# Patient Record
Sex: FEMALE | Employment: OTHER | URBAN - METROPOLITAN AREA
[De-identification: names, ages, dates, MRNs, and addresses within clinical notes are randomized per-mention and may not be internally consistent; named-entity substitution may affect disease eponyms.]

---

## 2017-05-10 ENCOUNTER — OFFICE VISIT (OUTPATIENT)
Dept: OBGYN CLINIC | Facility: HOSPITAL | Age: 82
End: 2017-05-10
Payer: COMMERCIAL

## 2017-06-14 ENCOUNTER — OFFICE VISIT (OUTPATIENT)
Dept: OBGYN CLINIC | Facility: HOSPITAL | Age: 82
End: 2017-06-14
Payer: COMMERCIAL

## 2017-07-25 ENCOUNTER — OFFICE VISIT (OUTPATIENT)
Dept: OBGYN CLINIC | Facility: HOSPITAL | Age: 82
End: 2017-07-25
Payer: COMMERCIAL

## 2017-08-29 ENCOUNTER — OFFICE VISIT (OUTPATIENT)
Dept: OBGYN CLINIC | Facility: HOSPITAL | Age: 82
End: 2017-08-29
Payer: COMMERCIAL

## 2017-10-11 ENCOUNTER — OFFICE VISIT (OUTPATIENT)
Dept: OBGYN CLINIC | Facility: HOSPITAL | Age: 82
End: 2017-10-11
Payer: COMMERCIAL

## 2017-11-20 ENCOUNTER — OFFICE VISIT (OUTPATIENT)
Dept: OBGYN CLINIC | Facility: HOSPITAL | Age: 82
End: 2017-11-20
Payer: COMMERCIAL

## 2018-01-22 ENCOUNTER — OFFICE VISIT (OUTPATIENT)
Dept: OBGYN CLINIC | Facility: HOSPITAL | Age: 83
End: 2018-01-22
Payer: COMMERCIAL

## 2018-03-20 ENCOUNTER — OFFICE VISIT (OUTPATIENT)
Dept: OBGYN CLINIC | Facility: HOSPITAL | Age: 83
End: 2018-03-20
Payer: COMMERCIAL

## 2018-05-21 ENCOUNTER — OFFICE VISIT (OUTPATIENT)
Dept: OBGYN CLINIC | Facility: HOSPITAL | Age: 83
End: 2018-05-21
Payer: COMMERCIAL

## 2018-07-16 ENCOUNTER — OFFICE VISIT (OUTPATIENT)
Dept: OBGYN CLINIC | Facility: HOSPITAL | Age: 83
End: 2018-07-16
Payer: COMMERCIAL

## 2018-09-11 ENCOUNTER — OFFICE VISIT (OUTPATIENT)
Dept: OBGYN CLINIC | Facility: HOSPITAL | Age: 83
End: 2018-09-11
Payer: COMMERCIAL

## 2018-11-07 ENCOUNTER — OFFICE VISIT (OUTPATIENT)
Dept: OBGYN CLINIC | Facility: HOSPITAL | Age: 83
End: 2018-11-07
Payer: COMMERCIAL

## 2019-01-07 ENCOUNTER — OFFICE VISIT (OUTPATIENT)
Dept: OBGYN CLINIC | Facility: HOSPITAL | Age: 84
End: 2019-01-07
Payer: COMMERCIAL

## 2019-03-18 ENCOUNTER — OFFICE VISIT (OUTPATIENT)
Dept: OBGYN CLINIC | Facility: HOSPITAL | Age: 84
End: 2019-03-18
Payer: COMMERCIAL

## 2019-05-29 ENCOUNTER — OFFICE VISIT (OUTPATIENT)
Dept: OBGYN CLINIC | Facility: HOSPITAL | Age: 84
End: 2019-05-29
Payer: COMMERCIAL

## 2019-08-21 ENCOUNTER — OFFICE VISIT (OUTPATIENT)
Dept: OBGYN CLINIC | Facility: HOSPITAL | Age: 84
End: 2019-08-21
Payer: COMMERCIAL

## 2019-11-12 ENCOUNTER — OFFICE VISIT (OUTPATIENT)
Dept: OBGYN CLINIC | Facility: HOSPITAL | Age: 84
End: 2019-11-12
Payer: COMMERCIAL

## 2021-01-27 ENCOUNTER — IMMUNIZATIONS (OUTPATIENT)
Dept: FAMILY MEDICINE CLINIC | Facility: HOSPITAL | Age: 86
End: 2021-01-27

## 2021-01-27 DIAGNOSIS — Z23 ENCOUNTER FOR IMMUNIZATION: Primary | ICD-10-CM

## 2021-01-27 PROCEDURE — 0011A SARS-COV-2 / COVID-19 MRNA VACCINE (MODERNA) 100 MCG: CPT

## 2021-01-27 PROCEDURE — 91301 SARS-COV-2 / COVID-19 MRNA VACCINE (MODERNA) 100 MCG: CPT

## 2021-02-24 ENCOUNTER — IMMUNIZATIONS (OUTPATIENT)
Dept: FAMILY MEDICINE CLINIC | Facility: HOSPITAL | Age: 86
End: 2021-02-24

## 2021-02-24 DIAGNOSIS — Z23 ENCOUNTER FOR IMMUNIZATION: Primary | ICD-10-CM

## 2021-02-24 PROCEDURE — 91301 SARS-COV-2 / COVID-19 MRNA VACCINE (MODERNA) 100 MCG: CPT

## 2021-02-24 PROCEDURE — 0012A SARS-COV-2 / COVID-19 MRNA VACCINE (MODERNA) 100 MCG: CPT

## 2024-10-09 ENCOUNTER — OFFICE VISIT (OUTPATIENT)
Dept: AUDIOLOGY | Age: 89
End: 2024-10-09
Payer: COMMERCIAL

## 2024-10-09 DIAGNOSIS — H90.3 SENSORY HEARING LOSS, BILATERAL: Primary | ICD-10-CM

## 2024-10-09 PROCEDURE — V5160 DISPENSING FEE BINAURAL: HCPCS | Performed by: AUDIOLOGIST

## 2024-10-09 PROCEDURE — 92567 TYMPANOMETRY: CPT | Performed by: AUDIOLOGIST

## 2024-10-09 PROCEDURE — 92557 COMPREHENSIVE HEARING TEST: CPT | Performed by: AUDIOLOGIST

## 2024-10-09 PROCEDURE — V5261 HEARING AID, DIGIT, BIN, BTE: HCPCS | Performed by: AUDIOLOGIST

## 2024-10-09 NOTE — PROGRESS NOTES
Hearing Aid Evaluation  Name:  Lucrecia Myers  :  1935  Age:  89 y.o.  MRN:  [id number]  Date of Evaluation: 10/09/24     HISTORY:    Lucrecia Myers was seen today for a hearing aid evaluation following her audiometric testing performed on 10/9/24. Lucrecia was referred by Dr. Bianchi.     RESULTS REVIEW:    The audiometric findings were reviewed with the patient . All of the patient's questions regarding her hearing status were addressed, and the importance of realistic expectations of hearing loss and amplification were discussed.      DEVICE REVIEW & RECOMMENDATION:     Strengths and limitations of amplification, including various hearing aid styles, technology, options, and accessories were discussed at length with patient. Hearing aids are assistive devices and are not designed to restore normal hearing. The patient was counseled on the importance of self-advocacy, motivation, as well as effective communication strategies that can be used to optimize hearing aid success. Based on the degree of her hearing loss, preferences, and lifestyle needs, the following hearing recommendations were made:    The first hearing aid recommendation is Oticon REAL 2 miniRITE R.  The second hearing aid recommendation is Oticon REAL 1 miniRITE R.    [facility]'s office policies regarding our hearing aid program were reviewed, including the 45 day trial period, non-refundable return fees, as well as the  warranties and service plan. Hearing aid cost, and payment, as well as insurance benefit (if applicable) were discussed with the patient.     *See attached quote sheet    DEVICE SELECTION:    At this time, patient wishes to proceed with the purchase of the below listed hearing aid(s).     The patient selected the Oticon REAL 2 miniRITE R hearing aids.    Level: Advanced   Color: 44    size: Right 2, 85 / Left 2, 85   Dome size: 8mmDB       Devices ordered as specified above through email to  CQ.    Mia Richardson  Clinical Audiologist  [facility]  [address] [address]

## 2024-10-09 NOTE — PROGRESS NOTES
Diagnostic Hearing Evaluation    Name:  Lucrecia Myers  :  1935  Age:  89 y.o.   MRN:  [id number]  Date of Evaluation: 10/09/24     HISTORY:     Reason for visit: Difficulty Understanding    Lucrecia Myers is being seen today at the request of Dr. Bianchi for an initial  evaluation of hearing. The patient  denies otalgia, dizziness, and tinnitus.     EVALUATION:    Otoscopic Evaluation:   Right Ear: Unremarkable, canal clear, Non-occluding cerumen, TM view obscured    Left Ear: Unremarkable, canal clear    Tympanometry:   Right Ear: Type A; normal middle ear pressure and static compliance    Left Ear: Type As, normal middle ear pressure with decreased static compliance, consistent with a hypomobile tympanic membrane.     Speech Audiometry:  Speech Reception (SRT)    Right Ear: 45 dB HL    Left Ear: 45 dB HL    Word Recognition Scores (WRS):  Right Ear: 72 % correct     Left Ear: 64 % correct    Stimuli: [id number]    Pure Tone Audiometry:  Conventional pure tone audiometry from 250 - 8000 Hz  was obtained with good reliability and revealed mild to moderately severe sensorineural hearing loss bilaterally.    *see attached audiogram    RECOMMENDATIONS:  Annual hearing eval, Return to [facility]. for F/U, Hearing Aid Evaluation, and Copy to Patient/Caregiver    PATIENT EDUCATION:   The results of today's results and recommendations were reviewed with the patient and her hearing thresholds were explained at length. Treatment options, including amplification and communication strategies, were discussed as appropriate. The patient voiced understanding of her test results. Questions were addressed and the patient was encouraged to contact our department should concerns arise.      Mia Richardson  Clinical Audiologist  [facility]  [address]  [place] [address]

## 2024-10-10 NOTE — PROGRESS NOTES
10/10/2024 Hearing aids arrived.   Right s/n [id number]  Left s/n [id number]   s/n [id number]  Warranty date 11/8/27.    [facility]

## 2024-10-16 ENCOUNTER — OFFICE VISIT (OUTPATIENT)
Dept: AUDIOLOGY | Age: 89
End: 2024-10-16

## 2024-10-16 DIAGNOSIS — H90.3 SENSORY HEARING LOSS, BILATERAL: Primary | ICD-10-CM

## 2024-10-30 ENCOUNTER — OFFICE VISIT (OUTPATIENT)
Dept: AUDIOLOGY | Age: 89
End: 2024-10-30

## 2024-10-30 DIAGNOSIS — H90.3 SENSORY HEARING LOSS, BILATERAL: Primary | ICD-10-CM

## 2024-10-30 NOTE — PROGRESS NOTES
Hearing Aid Visit:    Name:  Lucrecia Myers  :  1935  Age:  89 y.o.  MRN:  [id number]  Date of Evaluation: 10/30/24     HISTORY:    Lucrecia Myers was seen today (10/30/2024) for a(n) in-warranty hearing aid check of her bilateral hearing aids. Today, Lucrecia reports that she notices benefit from the hearing aids and is acclimating well.    Most recent Audiogram: 10/9/24    DEVICE INFORMATION:          Left Device Right Device   Hearing Aid Make: Oticon  Oticon    Hearing Aid Model: REAL 2 miniRITE R REAL 2 miniRITE R   Serial Number: [id number]   Repair Warranty Date: 27   Loss/Damage Warranty Status: Active  Active         Length/Output 2, 85 2, 85   Wax System: Pro Wax miniFIT Pro Wax miniFIT   Dome Size/Style: 8mm DV 8mm DV   Battery: Lithium-ion Rechargeable Lithium-ion Rechargeable       Earmold Serial Number: N/A N/A   Earmold Warranty Date:  N/A N/A    Serial Number:  [id number]    Warranty Date:  27     Accessories: N/A       ACTION/ADJUSTMENTS:    Activated volume controls on instruments and reviewed use. Patient is doing much better manipulating the hearing aids. Reviewed wax trap and dome changing. Patient did not have her phone with her today. She will bring it to the next visit so we can see if it is compatible with the hearing aids.    RECOMMENDATIONS:     3 week HAV scheduled.      Mia Richardson  Clinical Audiologist  [facility] AUDIOLOGY & HEAR[facility]ID [facility]  [address] [place] [address] [address]  [place] [address]

## 2024-11-20 ENCOUNTER — OFFICE VISIT (OUTPATIENT)
Dept: AUDIOLOGY | Age: 89
End: 2024-11-20

## 2024-11-20 DIAGNOSIS — H90.3 SENSORY HEARING LOSS, BILATERAL: Primary | ICD-10-CM

## 2024-11-20 NOTE — PROGRESS NOTES
Hearing Aid Visit:    Name:  Lucrecia Myers  :  1935  Age:  89 y.o.  MRN:  [id number]  Date of Evaluation: 24     HISTORY:    Lucrecia Myers was seen today (2024) for a(n) in-warranty hearing aid check of her bilateral hearing aids. Today, Lucrecia reports good benefit from the instruments. She notes some tenderness behind left ear, both with and without the hearing aid. She reports that she was seen by a physician, and that he did not see anything concerning.    Most recent Audiogram: 10/9/24    DEVICE INFORMATION:          Left Device Right Device   Hearing Aid Make: [employer]ermatic  [employer]    Hearing Aid Model: REAL 2 miniRITE R REAL 2 miniRITE R   Serial Number: [id number]   Repair Warranty Date: 27   Loss/Damage Warranty Status: Active  Active         Length/Output 2, 85 2, 85   Wax System: Pro Wax miniFIT Pro Wax miniFIT   Dome Size/Style: 8mm DV 8mm DV   Battery: Lithium-ion Rechargeable Lithium-ion Rechargeable       E[employer]old Serial Number: N/A N/A   E[employer]old Warranty Date:  N/A N/A    Serial Number:  [id number]    Warranty Date:  27     Accessories: N/A     ACTION/ADJUSTMENTS:    Inspection of postauricular area of left ear revealed no sores. Advised patient that she may try to cushion space between hearing aid and head to see if that helps, but she notes that tenderness is present even without hearing aid.    Had patient practice changing domes - still difficult for her due to vision issues.    RECOMMENDATIONS:     1 month HAV scheduled.      Mia Richardson  Clinical Audiologist  [facility]UD[facility] & [facility]  [address]  [place] [address]

## 2024-12-19 ENCOUNTER — OFFICE VISIT (OUTPATIENT)
Dept: AUDIOLOGY | Age: 89
End: 2024-12-19

## 2024-12-19 DIAGNOSIS — H90.3 SENSORY HEARING LOSS, BILATERAL: Primary | ICD-10-CM

## 2024-12-19 NOTE — PROGRESS NOTES
Hearing Aid Visit:    Name:  Lucrecia Myers  :  1935  Age:  89 y.o.  MRN:  [id number]  Date of Evaluation: 24     HISTORY:    Lucrecia Myers was seen today (2024) for a(n) in-warranty hearing aid check of her bilateral hearing aids. Today, Lucrecia continues to report that hearing aids are performing well, and that she is noticing significant benefit.    Most recent Audiogram: 10/9/24    DEVICE INFORMATION:        Left Device Right Device   Hearing Aid Make: [employer]  Oticon    Hearing Aid Model: REAL 2 miniRITE R REAL 2 miniRITE R   Serial Number: [id number]   Repair Warranty Date: 27   Loss/Damage Warranty Status: Active  Active         Length/Output 2, 85 2, 85   Wax System: Pro Wax miniFIT Pro Wax miniFIT   Dome Size/Style: 8mm DV 8mm DV   Battery: Lithium-ion Rechargeable Lithium-ion Rechargeable       Earmold Serial Number: N/A N/A   Earmold Warranty Date:  N/A N/A    Serial Number:  [id number]    Warranty Date:  27     Accessories: N/A      ACTION/ADJUSTMENTS:    Aids are now at level 3. Patient pleased with sound quality. Cleaned each instrument and changed wax traps and domes.    RECOMMENDATIONS:     Patient will contact center with any concerns.      Mia Richardson  Clinical Audiologist  [facility]UDIOLOGY & [facility]  [facility] [address] [address]